# Patient Record
Sex: MALE
[De-identification: names, ages, dates, MRNs, and addresses within clinical notes are randomized per-mention and may not be internally consistent; named-entity substitution may affect disease eponyms.]

---

## 2020-09-05 ENCOUNTER — HOSPITAL ENCOUNTER (EMERGENCY)
Dept: HOSPITAL 88 - ER | Age: 30
Discharge: HOME | End: 2020-09-05
Payer: SELF-PAY

## 2020-09-05 VITALS — BODY MASS INDEX: 42.66 KG/M2 | WEIGHT: 315 LBS | HEIGHT: 72 IN

## 2020-09-05 VITALS — SYSTOLIC BLOOD PRESSURE: 142 MMHG | DIASTOLIC BLOOD PRESSURE: 76 MMHG

## 2020-09-05 DIAGNOSIS — M79.605: ICD-10-CM
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
## 2020-09-05 DIAGNOSIS — F17.210: ICD-10-CM

## 2020-09-05 DIAGNOSIS — R00.0: ICD-10-CM
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
## 2020-09-05 DIAGNOSIS — I10: ICD-10-CM

## 2020-09-05 DIAGNOSIS — S01.112A: Primary | ICD-10-CM

## 2020-09-05 LAB
ALBUMIN SERPL-MCNC: 4.5 G/DL (ref 3.5–5)
ALBUMIN/GLOB SERPL: 1.3 {RATIO} (ref 0.8–2)
ALP SERPL-CCNC: 88 IU/L (ref 40–150)
ALT SERPL-CCNC: 73 IU/L (ref 0–55)
AMPHETAMINES UR QL SCN>1000 NG/ML: NEGATIVE
ANION GAP SERPL CALC-SCNC: 17.9 MMOL/L (ref 8–16)
BACTERIA URNS QL MICRO: (no result) /HPF
BASOPHILS # BLD AUTO: 0.1 10*3/UL (ref 0–0.1)
BASOPHILS NFR BLD AUTO: 0.5 % (ref 0–1)
BENZODIAZ UR QL SCN: NEGATIVE
BILIRUB UR QL: NEGATIVE
BUN SERPL-MCNC: 12 MG/DL (ref 7–26)
BUN/CREAT SERPL: 13 (ref 6–25)
CALCIUM SERPL-MCNC: 9 MG/DL (ref 8.4–10.2)
CHLORIDE SERPL-SCNC: 103 MMOL/L (ref 98–107)
CLARITY UR: CLEAR
CO2 SERPL-SCNC: 23 MMOL/L (ref 22–29)
COLOR UR: YELLOW
DEPRECATED APTT PLAS QN: 28.5 SECONDS (ref 23.8–35.5)
DEPRECATED INR PLAS: 0.94
DEPRECATED NEUTROPHILS # BLD AUTO: 14.5 10*3/UL (ref 2.1–6.9)
DEPRECATED RBC URNS MANUAL-ACNC: (no result) /HPF (ref 0–5)
EGFRCR SERPLBLD CKD-EPI 2021: > 60 ML/MIN (ref 60–?)
EOSINOPHIL # BLD AUTO: 0 10*3/UL (ref 0–0.4)
EOSINOPHIL NFR BLD AUTO: 0.1 % (ref 0–6)
EPI CELLS URNS QL MICRO: (no result) /LPF
ERYTHROCYTE [DISTWIDTH] IN CORD BLOOD: 12.2 % (ref 11.7–14.4)
GLOBULIN PLAS-MCNC: 3.6 G/DL (ref 2.3–3.5)
GLUCOSE SERPLBLD-MCNC: 102 MG/DL (ref 74–118)
HCT VFR BLD AUTO: 42.9 % (ref 38.2–49.6)
HGB BLD-MCNC: 14.6 G/DL (ref 14–18)
KETONES UR QL STRIP.AUTO: NEGATIVE
LEUKOCYTE ESTERASE UR QL STRIP.AUTO: NEGATIVE
LYMPHOCYTES # BLD: 1.9 10*3/UL (ref 1–3.2)
LYMPHOCYTES NFR BLD AUTO: 10.6 % (ref 18–39.1)
MCH RBC QN AUTO: 28.8 PG (ref 28–32)
MCHC RBC AUTO-ENTMCNC: 34 G/DL (ref 31–35)
MCV RBC AUTO: 84.6 FL (ref 81–99)
MONOCYTES # BLD AUTO: 1.1 10*3/UL (ref 0.2–0.8)
MONOCYTES NFR BLD AUTO: 6.2 % (ref 4.4–11.3)
NEUTS SEG NFR BLD AUTO: 81.8 % (ref 38.7–80)
NITRITE UR QL STRIP.AUTO: NEGATIVE
PCP UR QL SCN: NEGATIVE
PLATELET # BLD AUTO: 392 X10E3/UL (ref 140–360)
POTASSIUM SERPL-SCNC: 3.9 MMOL/L (ref 3.5–5.1)
PROT UR QL STRIP.AUTO: NEGATIVE
PROTHROMBIN TIME: 13.1 SECONDS (ref 11.9–14.5)
RBC # BLD AUTO: 5.07 X10E6/UL (ref 4.3–5.7)
SODIUM SERPL-SCNC: 140 MMOL/L (ref 136–145)
SP GR UR STRIP: 1.02 (ref 1.01–1.02)
UROBILINOGEN UR STRIP-MCNC: 0.2 MG/DL (ref 0.2–1)
WBC #/AREA URNS HPF: (no result) /HPF (ref 0–5)

## 2020-09-05 PROCEDURE — 71260 CT THORAX DX C+: CPT

## 2020-09-05 PROCEDURE — 85730 THROMBOPLASTIN TIME PARTIAL: CPT

## 2020-09-05 PROCEDURE — 73590 X-RAY EXAM OF LOWER LEG: CPT

## 2020-09-05 PROCEDURE — 80320 DRUG SCREEN QUANTALCOHOLS: CPT

## 2020-09-05 PROCEDURE — 99284 EMERGENCY DEPT VISIT MOD MDM: CPT

## 2020-09-05 PROCEDURE — 36415 COLL VENOUS BLD VENIPUNCTURE: CPT

## 2020-09-05 PROCEDURE — 80307 DRUG TEST PRSMV CHEM ANLYZR: CPT

## 2020-09-05 PROCEDURE — 80053 COMPREHEN METABOLIC PANEL: CPT

## 2020-09-05 PROCEDURE — 85610 PROTHROMBIN TIME: CPT

## 2020-09-05 PROCEDURE — 70486 CT MAXILLOFACIAL W/O DYE: CPT

## 2020-09-05 PROCEDURE — 12013 RPR F/E/E/N/L/M 2.6-5.0 CM: CPT

## 2020-09-05 PROCEDURE — 90471 IMMUNIZATION ADMIN: CPT

## 2020-09-05 PROCEDURE — 90714 TD VACC NO PRESV 7 YRS+ IM: CPT

## 2020-09-05 PROCEDURE — 85025 COMPLETE CBC W/AUTO DIFF WBC: CPT

## 2020-09-05 PROCEDURE — 81001 URINALYSIS AUTO W/SCOPE: CPT

## 2020-09-05 PROCEDURE — 72125 CT NECK SPINE W/O DYE: CPT

## 2020-09-05 PROCEDURE — 74177 CT ABD & PELVIS W/CONTRAST: CPT

## 2020-09-05 PROCEDURE — 70450 CT HEAD/BRAIN W/O DYE: CPT
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
## 2020-09-05 NOTE — NUR
ASKED PT IS OK TO GIVE [employer] COPY OF UDS AND ETOH. BOTH NEGATIVE. PT 
STATES VERBAL UNDERSTANDING AND STATES "YES, TOTALLY OK" TO GIVE [employer] 
PAPER RESULTS. COPIES TO SAFT[employer] OFFICER.
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
## 2021-03-11 ENCOUNTER — OUTSIDE PLACE OF SERVICE (OUTPATIENT)
Dept: CARDIOLOGY | Facility: CLINIC | Age: 31
End: 2021-03-11

## 2021-03-11 PROCEDURE — 93010 ELECTROCARDIOGRAM REPORT: CPT | Mod: ,,, | Performed by: INTERNAL MEDICINE

## 2021-03-11 PROCEDURE — 93010 PR ELECTROCARDIOGRAM REPORT: ICD-10-PCS | Mod: ,,, | Performed by: INTERNAL MEDICINE
